# Patient Record
(demographics unavailable — no encounter records)

---

## 2025-02-24 NOTE — HISTORY OF PRESENT ILLNESS
[FreeTextEntry1] : 67 year old female with PMHx of HTN, HLD, preDM, elevated CAC (1462 in 6/2022), obesity who presents for follow-up.  Feels well at this time. Reports adherence with medications.  No SOB, JOHNSON, orthopnea, PND. Reports having 'skipped beats' multiple times a week, non exertional, particularly in the evening. She has had some chest discomfort episodes for which she takes nitroglycerin. Reports she does not exert herself very often.   ==================================== Labs:  Dec 2024 lipids  HDL 51 LDL 52 ; a1c 5.8% 8/14/2024: chol 211, HDL 47, , nonHDL 164, CMP wnl; hemoglobin A1c 6.1 02/07/2024: chol 175, , HDL 45, , hemoglobin A1c 5.9 5/30/2023- chol 176, LDL 97, direct 110, HDL 49, A1C 6.3% 5/25/22: A1c 6.2%  Sept 2024 CCTA - Dense calcific plaque in LAD, and  RCA stenosis cannot be fully assessed in those segments, Non obstructive LCx, The results of the CT-FFR analysis are: LAD: 0.65  (3) LCx :0.84(1) RCA: 0.71 (3)  TTE 09/2023: Left ventricular wall thickness is mildly increased. Left ventricular systolic function is hyperdynamic with an ejection fraction of 75 %; There is normal left ventricular diastolic function. Mild mitral regurgitation. [id number] from 6/2022 Carotid doppler 8/1/2019: doesn't have report but knows there was plaque pharm NUC 8/19/2019: normal myocardial perfusion

## 2025-02-24 NOTE — REVIEW OF SYSTEMS
[Weight Loss (___ Lbs)] : [unfilled] ~Ulb weight loss [Lower Ext Edema] : lower extremity edema [Nausea] : nausea [SOB] : no shortness of breath [Dyspnea on exertion] : not dyspnea during exertion [Chest Discomfort] : no chest discomfort [Abdominal Pain] : no abdominal pain [Vomiting] : no vomiting [Dysuria] : no dysuria [Weakness] : no weakness [FreeTextEntry7] : nausea with bempedoic acid;

## 2025-02-24 NOTE — PHYSICAL EXAM
[No Acute Distress] : no acute distress [Normal Conjunctiva] : normal conjunctiva [No Carotid Bruit] : no carotid bruit [Normal S1, S2] : normal S1, S2 [No Murmur] : no murmur [No Rub] : no rub [Clear Lung Fields] : clear lung fields [Good Air Entry] : good air entry [No Respiratory Distress] : no respiratory distress  [Soft] : abdomen soft [Non Tender] : non-tender [Moves all extremities] : moves all extremities [No Focal Deficits] : no focal deficits [Normal Speech] : normal speech [Alert and Oriented] : alert and oriented [Normal memory] : normal memory [de-identified] : obese [de-identified] : legs in compression knee high socls; no edema noted

## 2025-02-24 NOTE — DISCUSSION/SUMMARY
[FreeTextEntry1] : 67 year old female with PMHx of HTN, HLD, preDM, elevated CAC (1462 in 6/2022), obesity who presents for follow-up.  CAD, HLD, statin intolerance: CCTA from Sept 2024 with FFR 0.65 in LAD and 0.71 in RCA. Pt has declined LHC in the past due to concerns about radiation.  LDL goal at least <70mg/dL. Not able to tolerate zetia due to abdominal symptoms, tried bempedoic acid but she became nauseous.  -c/w Praluent  - c/w fluvastatin 20mg daily as she is tolerating it well - continue plavix daily - start toprol 25mg daily  - advised LHC again, she will think about it and let us know   HTN: well controlled - c/w telmisartan 80mg qd (switched from ramipril in Oct 2023)  #LE edema: Notes LE edema that improved with compression socks and jardiance.  - repeat TTE in office today - advised to continue wearing compression socks during the day and keeping legs elevated overnight   Obesity, preDM: - c/w metformin 500 mg BID, jardiance 10mg, and Ozempic - f/up with PCP or endocrinologist for preDM management  - Encouraged patient to continue healthy exercise and eating habits, focusing on a Mediterranean style of eating w/ more plant based foods in general, and gradually increasing her physical activity as she is able, aiming for the recommended 150 minutes per week of moderate physical activity.   RTC 6 months for in person follow up with Dr. Dickinson.  [EKG obtained to assist in diagnosis and management of assessed problem(s)] : EKG obtained to assist in diagnosis and management of assessed problem(s)

## 2025-02-24 NOTE — PHYSICAL EXAM
[No Acute Distress] : no acute distress [Normal Conjunctiva] : normal conjunctiva [No Carotid Bruit] : no carotid bruit [Normal S1, S2] : normal S1, S2 [No Murmur] : no murmur [No Rub] : no rub [Clear Lung Fields] : clear lung fields [Good Air Entry] : good air entry [No Respiratory Distress] : no respiratory distress  [Soft] : abdomen soft [Non Tender] : non-tender [Moves all extremities] : moves all extremities [No Focal Deficits] : no focal deficits [Normal Speech] : normal speech [Alert and Oriented] : alert and oriented [Normal memory] : normal memory [de-identified] : obese [de-identified] : legs in compression knee high socls; no edema noted

## 2025-02-24 NOTE — END OF VISIT
[Time Spent: ___ minutes] : I have spent [unfilled] minutes of time on the encounter which excludes teaching and separately reported services. [] : Fellow [FreeTextEntry3] : Patient seen and examined. Case discussed with fellow. Agree with plan as detailed above.

## 2025-02-24 NOTE — HISTORY OF PRESENT ILLNESS
[FreeTextEntry1] : 67 year old female with PMHx of HTN, HLD, preDM, elevated CAC (1462 in 6/2022), obesity who presents for follow-up.  Feels well at this time. Reports adherence with medications.  No SOB, JOHNSON, orthopnea, PND. Reports having 'skipped beats' multiple times a week, non exertional, particularly in the evening. She has had some chest discomfort episodes for which she takes nitroglycerin. Reports she does not exert herself very often.   ==================================== Labs:  Dec 2024 lipids  HDL 51 LDL 52 ; a1c 5.8% 8/14/2024: chol 211, HDL 47, , nonHDL 164, CMP wnl; hemoglobin A1c 6.1 02/07/2024: chol 175, , HDL 45, , hemoglobin A1c 5.9 5/30/2023- chol 176, LDL 97, direct 110, HDL 49, A1C 6.3% 5/25/22: A1c 6.2%  Sept 2024 CCTA - Dense calcific plaque in LAD, and  RCA stenosis cannot be fully assessed in those segments, Non obstructive LCx, The results of the CT-FFR analysis are: LAD: 0.65  (3) LCx :0.84(1) RCA: 0.71 (3)  TTE 09/2023: Left ventricular wall thickness is mildly increased. Left ventricular systolic function is hyperdynamic with an ejection fraction of 75 %; There is normal left ventricular diastolic function. Mild mitral regurgitation. [id number] from 6/2022 Carotid doppler 8/1/2019: doesn't have report but knows there was plaque pharm NUC 8/19/2019: normal myocardial perfusion

## 2025-03-13 NOTE — PHYSICAL EXAM
[No Acute Distress] : no acute distress [Normal Conjunctiva] : normal conjunctiva [No Carotid Bruit] : no carotid bruit [Normal S1, S2] : normal S1, S2 [No Murmur] : no murmur [No Rub] : no rub [Clear Lung Fields] : clear lung fields [Good Air Entry] : good air entry [No Respiratory Distress] : no respiratory distress  [Soft] : abdomen soft [Non Tender] : non-tender [Moves all extremities] : moves all extremities [No Focal Deficits] : no focal deficits [Normal Speech] : normal speech [Alert and Oriented] : alert and oriented [Normal memory] : normal memory [de-identified] : obese [de-identified] : legs in compression knee high socls; no edema noted

## 2025-03-13 NOTE — HISTORY OF PRESENT ILLNESS
[FreeTextEntry1] : 67 year old female with PMHx of HTN, HLD, preDM, elevated CAC (1462 in 6/2022), obesity and obstructive disease noted on CCTA now post cath on 3/10/25 with atherectomy and PCI to LAD and planned staged RCA for April 2025.   Overall, she feels very well. She had mild non-exertional CP post cath that has resolved. Her original chest pain is much improved with toprol and now post cath and she wishes to have staged procedure in May.   Feels well at this time. Reports adherence with medications.  No SOB, JOHNSON, orthopnea, PND. Reports having 'skipped beats' multiple times a week, non exertional, particularly in the evening. She has had some chest discomfort episodes for which she takes nitroglycerin. Reports she does not exert herself very often.   ==================================== Labs:  March 2025 lipids  Dec 2024 lipids  HDL 51 LDL 52 ; a1c 5.8% 8/14/2024: chol 211, HDL 47, , nonHDL 164, CMP wnl; hemoglobin A1c 6.1 02/07/2024: chol 175, , HDL 45, , hemoglobin A1c 5.9 5/30/2023- chol 176, LDL 97, direct 110, HDL 49, A1C 6.3% 5/25/22: A1c 6.2%  Sept 2024 CCTA - Dense calcific plaque in LAD, and  RCA stenosis cannot be fully assessed in those segments, Non obstructive LCx, The results of the CT-FFR analysis are: LAD: 0.65  (3) LCx :0.84(1) RCA: 0.71 (3)  TTE 09/2023: Left ventricular wall thickness is mildly increased. Left ventricular systolic function is hyperdynamic with an ejection fraction of 75 %; There is normal left ventricular diastolic function. Mild mitral regurgitation. [id number] from 6/2022 Carotid doppler 8/1/2019: doesn't have report but knows there was plaque pharm NUC 8/19/2019: normal myocardial perfusion  3/10/2025- DIAGNOSTIC SUMMARY: 1. Right dominant coronary circulation 2. 2-vessel CAD (LAD, RCA). CT FFR+ lesions (LAD 0.65, RCA 0.71) 3. LVEDP was elevated (18 mmHg) INTERVENTION SUMMARY: 1. Successful intervention of a proximal LAD lesion, prepped with IVL - 3.0x12mm Shockwave NC balloon. (NIK using a 3.0x34mm PHILIP stent, post dilated with a 3.0mm NC balloon). 2. Excellent final angiographic result, 0% residual stenosis and CRIS 3

## 2025-03-13 NOTE — END OF VISIT
[] : Fellow [Time Spent: ___ minutes] : I have spent [unfilled] minutes of time on the encounter which excludes teaching and separately reported services. [FreeTextEntry3] : Patient seen and examined. Case discussed with fellow. Agree with plan as detailed above.

## 2025-03-13 NOTE — DISCUSSION/SUMMARY
[EKG obtained to assist in diagnosis and management of assessed problem(s)] : EKG obtained to assist in diagnosis and management of assessed problem(s) [FreeTextEntry1] : 67 year old female with PMHx of HTN, HLD, preDM, elevated CAC (1462 in 6/2022), obesity who presents for follow-up.  CAD, HLD, statin intolerance: PCI to LAD with staged RCA pending. LDL goal at least <70mg/dL.  -c/w Praluent  - c/w fluvastatin 20mg daily as she is tolerating it well - continue plavix daily monotherapy - continue toprol 25mg daily   HTN: well controlled - c/w telmisartan 80mg qd (switched from ramipril in Oct 2023)  #LE edema: Notes LE edema that improved with compression socks and jardiance.  - advised to continue wearing compression socks during the day and keeping legs elevated overnight   Obesity, preDM: - c/w metformin 500 mg BID, jardiance 10mg, and Ozempic - f/up with PCP or endocrinologist for preDM management  - Encouraged patient to continue healthy exercise and eating and physical activity without significant exertion at this time.   RTC May post second PCI.

## 2025-05-12 NOTE — END OF VISIT
[FreeTextEntry3] : Patient seen and examined. Case discussed with fellow. Agree with plan as detailed above.

## 2025-05-12 NOTE — DISCUSSION/SUMMARY
[FreeTextEntry1] : 67-year-old female with PMHx of HTN, HLD, preDM, elevated CAC (1462 in 6/2022), obesity and obstructive disease noted on CCTA now post cath on 3/10/25 with atherectomy and PCI to LAD and staged RCA with NIK x 2 May 8, 2025.  CAD, HLD, statin intolerance: PCI to LAD with staged RCA pending. LDL above goal <70mg/dL. Reports not tolerating fluvastatin 20. Has not tolerated other statins, zetia or bempedoic acid in the past either.  -c/w Praluent  - continue plavix daily monotherapy - continue toprol 25mg daily   HTN: well controlled - c/w telmisartan 80mg qd (switched from ramipril in Oct 2023)   Obesity, preDM: - c/w metformin 500 mg BID, jardiance 10mg, and Ozempic - f/up with PCP or endocrinologist for continued preDM management  - Encouraged patient to continue healthy eating and increase physical activity  RTC 6 months [EKG obtained to assist in diagnosis and management of assessed problem(s)] : EKG obtained to assist in diagnosis and management of assessed problem(s)

## 2025-05-12 NOTE — PHYSICAL EXAM
[de-identified] : obese [de-identified] : legs in compression knee high socls; no edema noted

## 2025-05-12 NOTE — HISTORY OF PRESENT ILLNESS
[FreeTextEntry1] : 67-year-old female with PMHx of HTN, HLD, preDM, elevated CAC (1462 in 6/2022), obesity and obstructive disease noted on CCTA now post cath on 3/10/25 with atherectomy and PCI to LAD and staged RCA with NIK x 2 May 8, 2025.  Overall, she feels very well. She had some dyspepsia post cath after receiving aspirin that improved with antacids. Her original chest pain is much improved. She is not taking fluvastatin due to muscle aches and possible rash. She is planning to start cardiac rehab soon.  Feels well at this time. Reports adherence with medications.  No SOB, JOHNSON, orthopnea, PND.    ==================================== Labs:  May 2025 lipids  HDL 49  ; a1c 6.1% (HIE) Dec 2024 lipids  HDL 51 LDL 52 ; a1c 5.8% 8/14/2024: chol 211, HDL 47, , nonHDL 164, CMP wnl; hemoglobin A1c 6.1 02/07/2024: chol 175, , HDL 45, , hemoglobin A1c 5.9 5/30/2023- chol 176, LDL 97, direct 110, HDL 49, A1C 6.3% 5/25/22: A1c 6.2%  Sept 2024 CCTA - Dense calcific plaque in LAD, and  RCA stenosis cannot be fully assessed in those segments, Non obstructive LCx, The results of the CT-FFR analysis are: LAD: 0.65  (3) LCx :0.84(1) RCA: 0.71 (3)  TTE 09/2023: Left ventricular wall thickness is mildly increased. Left ventricular systolic function is hyperdynamic with an ejection fraction of 75 %; There is normal left ventricular diastolic function. Mild mitral regurgitation. [id number] from 6/2022 Carotid doppler 8/1/2019: doesn't have report but knows there was plaque pharm NUC 8/19/2019: normal myocardial perfusion  3/10/2025- DIAGNOSTIC SUMMARY: 1. Right dominant coronary circulation 2. 2-vessel CAD (LAD, RCA). CT FFR+ lesions (LAD 0.65, RCA 0.71) 3. LVEDP was elevated (18 mmHg) INTERVENTION SUMMARY: 1. Successful intervention of a proximal LAD lesion, prepped with IVL - 3.0x12mm Shockwave NC balloon. (NIK using a 3.0x34mm PHILIP stent, post dilated with a 3.0mm NC balloon). 2. Excellent final angiographic result, 0% residual stenosis and CRIS 3

## 2025-05-12 NOTE — PHYSICAL EXAM
[de-identified] : obese [de-identified] : legs in compression knee high socls; no edema noted

## 2025-05-12 NOTE — REVIEW OF SYSTEMS
[SOB] : no shortness of breath [Dyspnea on exertion] : not dyspnea during exertion [Chest Discomfort] : no chest discomfort [Abdominal Pain] : no abdominal pain [Vomiting] : no vomiting [Dysuria] : no dysuria [Weakness] : no weakness [FreeTextEntry7] : nausea with bempedoic acid;

## 2025-05-12 NOTE — HISTORY OF PRESENT ILLNESS
[FreeTextEntry1] : 67-year-old female with PMHx of HTN, HLD, preDM, elevated CAC (1462 in 6/2022), obesity and obstructive disease noted on CCTA now post cath on 3/10/25 with atherectomy and PCI to LAD and staged RCA with NIK x 2 May 8, 2025.  Overall, she feels very well. She had some dyspepsia post cath after receiving aspirin that improved with antacids. Her original chest pain is much improved. She is not taking fluvastatin due to muscle aches and possible rash. She is planning to start cardiac rehab soon.  Feels well at this time. Reports adherence with medications.  No SOB, JOHNSON, orthopnea, PND.    ==================================== Labs:  May 2025 lipids  HDL 49  ; a1c 6.1% (HIE) Dec 2024 lipids  HDL 51 LDL 52 ; a1c 5.8% 8/14/2024: chol 211, HDL 47, , nonHDL 164, CMP wnl; hemoglobin A1c 6.1 02/07/2024: chol 175, , HDL 45, , hemoglobin A1c 5.9 5/30/2023- chol 176, LDL 97, direct 110, HDL 49, A1C 6.3% 5/25/22: A1c 6.2%  Sept 2024 CCTA - Dense calcific plaque in LAD, and  RCA stenosis cannot be fully assessed in those segments, Non obstructive LCx, The results of the CT-FFR analysis are: LAD: 0.65  (3) LCx :0.84(1) RCA: 0.71 (3)  TTE 09/2023: Left ventricular wall thickness is mildly increased. Left ventricular systolic function is hyperdynamic with an ejection fraction of 75 %; There is normal left ventricular diastolic function. Mild mitral regurgitation. [id number] from 6/2022 Carotid doppler 8/1/2019: doesn't have report but knows there was plaque pharm NUC 8/19/2019: normal myocardial perfusion  3/10/2025- DIAGNOSTIC SUMMARY: 1. Right dominant coronary circulation 2. 2-vessel CAD (LAD, RCA). CT FFR+ lesions (LAD 0.65, RCA 0.71) 3. LVEDP was elevated (18 mmHg) INTERVENTION SUMMARY: 1. Successful intervention of a proximal LAD lesion, prepped with IVL - 3.0x12mm Shockwave NC balloon. (NIK using a 3.0x34mm PHILIP stent, post dilated with a 3.0mm NC balloon). 2. Excellent final angiographic result, 0% residual stenosis and CRIS 3